# Patient Record
Sex: MALE | Employment: STUDENT | URBAN - METROPOLITAN AREA
[De-identification: names, ages, dates, MRNs, and addresses within clinical notes are randomized per-mention and may not be internally consistent; named-entity substitution may affect disease eponyms.]

---
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
## 2024-01-16 ENCOUNTER — HOSPITAL ENCOUNTER (OUTPATIENT)
Dept: PHYSICAL THERAPY | Age: 19
Setting detail: THERAPIES SERIES
Discharge: HOME | End: 2024-01-16
Payer: COMMERCIAL

## 2024-01-16 PROCEDURE — 97161 PT EVAL LOW COMPLEX 20 MIN: CPT

## 2024-01-16 NOTE — PLAN OF CARE
order to progress toward full function and prevent re-injury.    Status: [] Progressing: [] Met: [] Not Met: [] Adjusted  Patient will have a decrease in pain to 0/10 to help  facilitate improvement in movement, function, and ADLs as indicated by functional deficits.   Status: [] Progressing: [] Met: [] Not Met: [] Adjusted    Long Term Goals: To be achieved in: 8-10 weeks  Disability index score of 0% or less for the LEFS to assist with return top prior level of function.    Status: [] Progressing: [] Met: [] Not Met: [] Adjusted  Improve  knee AROM  Flexion to 135 degrees or  better to allow for proper joint functioning as indicated by patients functional deficits.  Status: [] Progressing: [] Met: [] Not Met: [] Adjusted  Pt to improve strength to show motor control/activation of quadriceps to facilitate functional mobility and ADLs.   Status: [] Progressing: [] Met: [] Not Met: [] Adjusted  Patient will return to Usual work, housework or activities, Usual recreational activities, squatting, and running without increased symptoms or restriction to work towards return to prior level of function.  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            Status: [] Progressing: [] Met: [] Not Met: [] Adjusted    TREATMENT PLAN     Frequency/Duration: 2x/week for 8-10 weeks for the following treatment interventions:    Interventions:  [x] Therapeutic exercise including: strength training, ROM, including postural re-education.   [x] NMR activation and proprioception, including postural re-education.    [x] Manual therapy as indicated to include: PROM, Gr I-IV mobilizations, and STM  [x] Modalities as needed that may include: Cryotherapy and Electrical Stimulation  [x] Patient education on joint protection, postural re-education, activity modification, progression of HEP.        [] Aquatic Therapy     HEP

## 2024-01-16 NOTE — FLOWSHEET NOTE
[facility] - Outpatient Rehabilitation and Therapy [address] [place] [address] office: [phone] fax: [phone]      Physical Therapy: TREATMENT/PROGRESS NOTE   Patient: Benedicto Hendricks (19 y.o. male)   Treatment Date: 2024   :  2005 MRN: [id number]   Visit #: 1   Insurance Allowable Auth Needed   Aetna []Yes    [x]No    Insurance: Payor: AETNA / Plan: AETNA / Product Type: *No Product type* /   Insurance ID: [id number] - (Commercial)  Secondary Insurance (if applicable): RICHAR VELOZ   Treatment Diagnosis:  Status post lateral meniscus repair of left knee [Z98.890], L knee pain M25.562   No diagnosis found.   Medical Diagnosis:    Status post lateral meniscus repair of left knee [Z98.890]   Referring Physician: Teresa Ocampo MD  PCP: No primary care provider on file.                             Plan of care signed (Y/N):     Date of Patient follow up with Physician:      Progress Report/POC: EVAL today  POC update due: (10 visits /OR AUTH LIMITS, whichever is less)  2/15/2024     Meniscal repair (Cut and paste Precautions/Contraindications from Eval)    Preferred Language for Healthcare:   [x]English       []other:    SUBJECTIVE EXAMINATION     Patient Report/Comments: see eval     Test used Initial score  2024   Pain Summary VAS 0    Functional questionnaire LEFS 40/80    Other:                OBJECTIVE EXAMINATION     Observation:     Test measurements: see eval    Exercises/Interventions:     Therapeutic Ex (51456)  resistance Sets/time Reps Notes/Cues/Progressions   Knee flexion strap stretch  5 4 for 20 sec                                                                   Manual Intervention (30030)  TIME                                        NMR re-education (35339) resistance Sets/time Reps CUES NEEDED                                      Therapeutic Activity (97257)  Sets/time     Stair navigation up/down  5 8 8 inch step, decreased left quad

## 2024-01-18 ENCOUNTER — HOSPITAL ENCOUNTER (OUTPATIENT)
Dept: PHYSICAL THERAPY | Age: 19
Setting detail: THERAPIES SERIES
Discharge: HOME | End: 2024-01-18
Payer: COMMERCIAL

## 2024-01-18 PROCEDURE — 97110 THERAPEUTIC EXERCISES: CPT

## 2024-01-18 PROCEDURE — 97112 NEUROMUSCULAR REEDUCATION: CPT

## 2024-01-18 PROCEDURE — 97140 MANUAL THERAPY 1/> REGIONS: CPT

## 2024-01-18 NOTE — FLOWSHEET NOTE
[facility] - Outpatient Rehabilitation and Therapy [address] [place] [address] office: [phone] fax: [phone]      Physical Therapy: TREATMENT/PROGRESS NOTE   Patient: Benedicto Hendricks (19 y.o. male)   Treatment Date: 2024   :  2005 MRN: [id number]   Visit #: 2   Insurance Allowable Auth Needed   Aetna []Yes    [x]No    Insurance: Payor: AETNA / Plan: AETNA / Product Type: *No Product type* /   Insurance ID: [id number] - (Commercial)  Secondary Insurance (if applicable): A XU VELOZ   Treatment Diagnosis:  Status post lateral meniscus repair of left knee [Z98.890], L knee pain M25.562   No diagnosis found.   Medical Diagnosis:    Status post lateral meniscus repair of left knee [Z98.890]   Referring Physician: Teresa Ocampo MD  PCP: No primary care provider on file.                             Plan of care signed (Y/N):     Date of Patient follow up with Physician:      Progress Report/POC: NO  POC update due: (10 visits /OR AUTH LIMITS, whichever is less)  2024     Meniscal repair (Cut and paste Precautions/Contraindications from Eval)    Preferred Language for Healthcare:   [x]English       []other:    SUBJECTIVE EXAMINATION     Patient Report/Comments: Patient reports no significant changes since last visit and it only feels uncomfortable after prolonged activity. Patient reports compliance with HEP with no issues reported. Patient reports swelling has remained the same and pain still present along the medial side of the knee consistent with last visit.     Test used Initial score  2024   Pain Summary VAS 0 1   Functional questionnaire LEFS 40/80    Other:                OBJECTIVE EXAMINATION     Observation: swelling noted along medial knee, no warmness or redness palpated. Hypomobility noted with patellar mobilizations    Test measurements: Left knee flexion AROM: 130 deg    Exercises/Interventions:     Therapeutic Ex (45925)  resistance

## 2024-01-23 ENCOUNTER — HOSPITAL ENCOUNTER (OUTPATIENT)
Dept: PHYSICAL THERAPY | Age: 19
Setting detail: THERAPIES SERIES
Discharge: HOME | End: 2024-01-23
Payer: COMMERCIAL

## 2024-01-23 PROCEDURE — 97110 THERAPEUTIC EXERCISES: CPT

## 2024-01-23 NOTE — FLOWSHEET NOTE
[facility] - Outpatient Rehabilitation and Therapy [address] [place] [address] office: [phone] fax: [phone]      Physical Therapy: TREATMENT/PROGRESS NOTE   Patient: Benedicto Hendricks (19 y.o. male)   Treatment Date: 2024   :  2005 MRN: [id number]   Visit #: 3   Insurance Allowable Auth Needed   Aetna []Yes    [x]No    Insurance: Payor: AETNA / Plan: AETNA / Product Type: *No Product type* /   Insurance ID: [id number] - (Commercial)  Secondary Insurance (if applicable): RICHAR VELOZ   Treatment Diagnosis:  Status post meniscus repair of left knee [Z98.890], L knee pain M25.562   No diagnosis found.   Medical Diagnosis:    Status post meniscus repair of left knee [Z98.890]   Referring Physician: Teresa Ocampo MD  PCP: No primary care provider on file.                             Plan of care signed (Y/N):     Date of Patient follow up with Physician:      Progress Report/POC: NO  POC update due: (10 visits /OR AUTH LIMITS, whichever is less)  2024     Meniscal repair (Cut and paste Precautions/Contraindications from Eval)    Preferred Language for Healthcare:   [x]English       []other:    SUBJECTIVE EXAMINATION     Patient Report/Comments: patient reports being out of the brace starting yesterday due to being at 7 weeks removed from surgery according to physician. Patient reports increased achiness yesterday but has returned to baseline. Pain is rated at 1/10 which was same compared to last visit.       Test used Initial score  2024   Pain Summary VAS 0 1   Functional questionnaire LEFS 40/80    Other:                OBJECTIVE EXAMINATION     Observation: Test measurements: Left knee flexion AROM: 130 deg, swelling present around L knee compared to R knee     Exercises/Interventions:     Therapeutic Ex (12233)  resistance Sets/time Reps Notes/Cues/Progressions   Bike  5 min     Knee flexion strap stretch   4 for 30 sec    Adduction stretch with

## 2024-01-25 ENCOUNTER — HOSPITAL ENCOUNTER (OUTPATIENT)
Dept: PHYSICAL THERAPY | Age: 19
Setting detail: THERAPIES SERIES
Discharge: HOME | End: 2024-01-25
Payer: COMMERCIAL

## 2024-01-25 PROCEDURE — 97112 NEUROMUSCULAR REEDUCATION: CPT

## 2024-01-25 PROCEDURE — 97140 MANUAL THERAPY 1/> REGIONS: CPT

## 2024-01-25 PROCEDURE — 97110 THERAPEUTIC EXERCISES: CPT

## 2024-01-25 NOTE — FLOWSHEET NOTE
[facility] - Outpatient Rehabilitation and Therapy [address] [place] [address] office: [phone] fax: [phone]      Physical Therapy: TREATMENT/PROGRESS NOTE   Patient: Benedicto Hendricks (19 y.o. male)   Treatment Date: 2024   :  2005 MRN: [id number]   Visit #: 4   Insurance Allowable Auth Needed   Aetna []Yes    [x]No    Insurance: Payor: AETNA / Plan: AETNA / Product Type: *No Product type* /   Insurance ID: [id number] - (Commercial)  Secondary Insurance (if applicable): RICHAR VELOZ   Treatment Diagnosis:  Status post meniscus repair of left knee [Z98.890], L knee pain M25.562   No diagnosis found.   Medical Diagnosis:    Status post meniscus repair of left knee [Z98.890]   Referring Physician: Teresa Ocampo MD  PCP: No primary care provider on file.                             Plan of care signed (Y/N):     Date of Patient follow up with Physician:      Progress Report/POC: NO  POC update due: (10 visits /OR AUTH LIMITS, whichever is less)  2024     Meniscal repair (Cut and paste Precautions/Contraindications from Eval)    Preferred Language for Healthcare:   [x]English       []other:    SUBJECTIVE EXAMINATION     Patient Report/Comments: Patient reports knee has been feeling better and patient reports decreased swelling. Patient reports no activities have been aggravating.        Test used Initial score  2024   Pain Summary VAS 0 0   Functional questionnaire LEFS 40/80    Other:                OBJECTIVE EXAMINATION     Observation: Test measurements: Reduced swelling noted in L knee compared to last visit    Exercises/Interventions:     Therapeutic Ex (37828)  resistance Sets/time Reps Notes/Cues/Progressions   Bike  5 min     Knee flexion strap stretch       Adduction stretch with strap       Long arc quads       sidestepping  3 2 Red band above knee. Verbal cues given to lead with heel to decrease ER bias   Step up with sport cord around

## 2024-01-30 ENCOUNTER — HOSPITAL ENCOUNTER (OUTPATIENT)
Dept: PHYSICAL THERAPY | Age: 19
Setting detail: THERAPIES SERIES
Discharge: HOME | End: 2024-01-30
Payer: COMMERCIAL

## 2024-01-30 PROCEDURE — 97110 THERAPEUTIC EXERCISES: CPT

## 2024-01-30 PROCEDURE — 97140 MANUAL THERAPY 1/> REGIONS: CPT

## 2024-01-30 NOTE — FLOWSHEET NOTE
the below criteria necessary for medical necessity for care and/or justification of therapy services:  The patient has functional impairments and/or activity limitations and would benefit from continued outpatient therapy services to address the deficits outlined in the patients goals    Treatment/Activity Tolerance:  [x] Patient tolerated treatment well [] Patient limited by fatique  [] Patient limited by pain  [] Patient limited by other medical complications  [] Other:     Return to Play: Stage 1: Intro to Strength    Prognosis for POC: [x] Good [] Fair  [] Poor    Patient requires continued skilled intervention: [x] Yes  [] No      GOALS     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   Status: [] Progressing: [] Met: [] Not Met: [] Adjusted     Therapist goals for Patient:   Short Term Goals: To be achieved in: 2 weeks  Independent in HEP and progression per patient tolerance, in order to progress toward full function and prevent re-injury.               Status: [] Progressing: [] Met: [] Not Met: [] Adjusted  Patient will have a decrease in pain to 0/10 to help  facilitate improvement in movement, function, and ADLs as indicated by functional deficits.              Status: [] Progressing: [] Met: [] Not Met: [] Adjusted     Long Term Goals: To be achieved in: 8-10 weeks  Disability index score of 0% or less for the LEFS to assist with return top prior level of function.                  Status: [] Progressing: [] Met: [] Not Met: [] Adjusted  Improve  knee AROM  Flexion to 135 degrees or  better to allow for proper joint functioning as indicated by patients functional deficits.  Status: [] Progressing: [] Met: [] Not Met: [] Adjusted  Pt to improve strength to show motor control/activation of quadriceps to facilitate functional mobility and ADLs.   Status: [] Progressing: [] Met: [] Not Met: [] Adjusted  Patient will return to Usual work, housework or activities, Usual recreational activities,

## 2024-02-01 ENCOUNTER — HOSPITAL ENCOUNTER (OUTPATIENT)
Dept: PHYSICAL THERAPY | Age: 19
Setting detail: THERAPIES SERIES
Discharge: HOME | End: 2024-02-01
Payer: COMMERCIAL

## 2024-02-01 PROCEDURE — 97110 THERAPEUTIC EXERCISES: CPT

## 2024-02-01 PROCEDURE — 97112 NEUROMUSCULAR REEDUCATION: CPT

## 2024-02-01 PROCEDURE — 97140 MANUAL THERAPY 1/> REGIONS: CPT

## 2024-02-01 NOTE — FLOWSHEET NOTE
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                                                                                                                      Status: [] Progressing: [] Met: [] Not Met: [] Adjusted    Overall Progression Towards Functional goals/ Treatment Progress Update:  [] Patient is progressing as expected towards functional goals listed.    [] Progression is slowed due to complexities/Impairments listed.  [] Progression has been slowed due to co-morbidities.  [x] Plan just implemented, too soon (<30days) to assess goals progression   [] Goals require adjustment due to lack of progress  [] Patient is not progressing as expected and requires additional follow up with physician  [] Other:     CHARGE CAPTURE     PT CHARGE GRID   CPT Code (TIMED) minutes # CPT Code (UNTIMED) #     Therex (44283)  25 2  EVAL:LOW (44505 - Typically 20 minutes face-to-face)     Neuromusc. Re-ed (91406) 15 1  Re-Eval (49752)     Manual (18217) 20 1  Estim Unattended (59635)     Ther. Act (66591)    Mech. Traction (45439)     Gait (92602)    Dry Needle 1-2 muscle (92238)     Aquatic Therex (99614)    Dry Needle 3+ muscle (20561)     Iontophoresis (85514)    VASO (74001)     Ultrasound (88317)    Group Therapy (72127)     Estim Attended (67567)    Canalith Repositioning (28828)     Other:    Other:    Total Timed Code Tx Minutes 60 4       Total Treatment Minutes 60      Charge Justification:  (85143) THERAPEUTIC EXERCISE - Provided verbal/tactile cueing for activities related to strengthening, flexibility, endurance, ROM performed to prevent loss of range of motion, maintain or improve muscular strength or increase flexibility, following either an injury or surgery.   (16617) HOME EXERCISE PROGRAM - Reviewed/Progressed HEP activities related to strengthening, flexibility, endurance, ROM performed to prevent loss of range of motion, maintain or improve

## 2024-02-06 ENCOUNTER — HOSPITAL ENCOUNTER (OUTPATIENT)
Dept: PHYSICAL THERAPY | Age: 19
Setting detail: THERAPIES SERIES
Discharge: HOME | End: 2024-02-06
Payer: COMMERCIAL

## 2024-02-06 PROCEDURE — 97140 MANUAL THERAPY 1/> REGIONS: CPT

## 2024-02-06 PROCEDURE — 97112 NEUROMUSCULAR REEDUCATION: CPT

## 2024-02-06 PROCEDURE — 97110 THERAPEUTIC EXERCISES: CPT

## 2024-02-06 NOTE — FLOWSHEET NOTE
[facility] - Outpatient Rehabilitation and Therapy [address] [place] [address] office: [phone] fax: [phone]      Physical Therapy: TREATMENT/PROGRESS NOTE   Patient: Benedicto Hendricks (19 y.o. male)   Treatment Date: 2024   :  2005 MRN: [id number]   Visit #: 7   Insurance Allowable Auth Needed   Aetna []Yes    [x]No    Insurance: Payor: AETNA / Plan: AETNA / Product Type: *No Product type* /   Insurance ID: [id number] - (Commercial)  Secondary Insurance (if applicable): A XU VELOZ   Treatment Diagnosis:  Status post meniscus repair of left knee [Z98.890], L knee pain M25.562   No diagnosis found.   Medical Diagnosis:    Status post meniscus repair of left knee [Z98.890]   Referring Physician: Teresa Ocampo MD  PCP: No primary care provider on file.                             Plan of care signed (Y/N):     Date of Patient follow up with Physician:      Progress Report/POC: NO  POC update due: (10 visits /OR AUTH LIMITS, whichever is less)  3/7/2024     Meniscal repair (Cut and paste Precautions/Contraindications from Eval)    Preferred Language for Healthcare:   [x]English       []other:    SUBJECTIVE EXAMINATION     Patient Report/Comments: Patient reports knee has been a little sore since the weekend, has been having to stand a lot more at practice. Doesn't feel like there has been anything significant to cause increased irritation.          Test used Initial score  2024   Pain Summary VAS 0 0   Functional questionnaire LEFS 40/80    Other:                OBJECTIVE EXAMINATION     Observation: Test measurements: Reduced swelling noted in L knee compared to last visit    Exercises/Interventions:     Therapeutic Ex (29224) 28 resistance Sets/time Reps Notes/Cues/Progressions   Bike  5 min     Adductor stretch  30 3    Hip flexor stretch- benedicto stretch  30 3    sidestepping Red band 3 2    Step up with yellow sport cord around waist with march up of

## 2024-02-08 ENCOUNTER — HOSPITAL ENCOUNTER (OUTPATIENT)
Dept: PHYSICAL THERAPY | Age: 19
Setting detail: THERAPIES SERIES
Discharge: HOME | End: 2024-02-08
Payer: COMMERCIAL

## 2024-02-08 PROCEDURE — 97112 NEUROMUSCULAR REEDUCATION: CPT

## 2024-02-08 PROCEDURE — 97140 MANUAL THERAPY 1/> REGIONS: CPT

## 2024-02-08 PROCEDURE — 97110 THERAPEUTIC EXERCISES: CPT

## 2024-02-08 NOTE — FLOWSHEET NOTE
[facility] - Outpatient Rehabilitation and Therapy [address] [place] [address] office: [phone] fax: [phone]      Physical Therapy: TREATMENT/PROGRESS NOTE   Patient: Benedicto Hendricks (19 y.o. male)   Treatment Date: 2024   :  2005 MRN: [id number]   Visit #: 8   Insurance Allowable Auth Needed   Aetna []Yes    [x]No    Insurance: Payor: AETNA / Plan: AETNA / Product Type: *No Product type* /   Insurance ID: [id number] - (Commercial)  Secondary Insurance (if applicable): RICHAR VELOZ   Treatment Diagnosis:  Status post meniscus repair of left knee [Z98.890], L knee pain M25.562   No diagnosis found.   Medical Diagnosis:    Status post meniscus repair of left knee [Z98.890]   Referring Physician: Teresa Ocampo MD  PCP: No primary care provider on file.                             Plan of care signed (Y/N):     Date of Patient follow up with Physician:      Progress Report/POC: NO  POC update due: (10 visits /OR AUTH LIMITS, whichever is less)  3/8/2024     Meniscal repair (Cut and paste Precautions/Contraindications from Eval)    Preferred Language for Healthcare:   [x]English       []other:    SUBJECTIVE EXAMINATION     Patient Report/Comments: Patient reports knee is feeling okay today. Was slightly sore after last session. He has some soreness in his quad as he stood a lot and rode the bike for 10 minutes yesterday.         Test used Initial score  2024   Pain Summary VAS 0 0   Functional questionnaire LEFS 40/80    Other:                OBJECTIVE EXAMINATION     Observation: Test measurements: Reduced swelling noted in L knee compared to last visit   R knee active flexion: 137 deg     Exercises/Interventions:     Therapeutic Ex (85397) 35 resistance Sets/time Reps Notes/Cues/Progressions   Bike  5 min           sidestepping Red band 3 2    Step up with yellow sport cord around waist with march up of opposite leg 12 in 1 15    Step up lateral 6 in 1

## 2024-02-12 ENCOUNTER — HOSPITAL ENCOUNTER (OUTPATIENT)
Dept: PHYSICAL THERAPY | Age: 19
Setting detail: THERAPIES SERIES
Discharge: HOME | End: 2024-02-12
Payer: COMMERCIAL

## 2024-02-12 PROCEDURE — 97110 THERAPEUTIC EXERCISES: CPT

## 2024-02-12 PROCEDURE — 97112 NEUROMUSCULAR REEDUCATION: CPT

## 2024-02-12 PROCEDURE — 97140 MANUAL THERAPY 1/> REGIONS: CPT

## 2024-02-12 NOTE — FLOWSHEET NOTE
[facility] - Outpatient Rehabilitation and Therapy [address] [place] [address] office: [phone] fax: [phone]      Physical Therapy: TREATMENT/PROGRESS NOTE   Patient: Benedicto Hendricks (19 y.o. male)   Treatment Date: 2024   :  2005 MRN: [id number]   Visit #: 9   Insurance Allowable Auth Needed   Aetna []Yes    [x]No    Insurance: Payor: AETNA / Plan: AETNA / Product Type: *No Product type* /   Insurance ID: [id number] - (Commercial)  Secondary Insurance (if applicable): RICHAR VELOZ   Treatment Diagnosis:  Status post meniscus repair of left knee [Z98.890], L knee pain M25.562   No diagnosis found.   Medical Diagnosis:    Status post meniscus repair of left knee [Z98.890]   Referring Physician: Teresa Ocampo MD  PCP: No primary care provider on file.                             Plan of care signed (Y/N):     Date of Patient follow up with Physician:      Progress Report/POC: NO  POC update due: (10 visits /OR AUTH LIMITS, whichever is less)  3/13/2024     Meniscal repair (Cut and paste Precautions/Contraindications from Eval)    Preferred Language for Healthcare:   [x]English       []other:    SUBJECTIVE EXAMINATION     Patient Report/Comments: Patient reports knee is feeling okay today. Not too sore after last session.          Test used Initial score  2024   Pain Summary VAS 0 0   Functional questionnaire LEFS 40/80    Other:                OBJECTIVE EXAMINATION     Observation: Test measurements: Reduced swelling noted in L knee compared to last visit   R knee active flexion: 137 deg     Exercises/Interventions:     Therapeutic Ex (79542) 35 resistance Sets/time Reps Notes/Cues/Progressions   Bike  5 min           Prone quad and hip flexor stretch with strap  30 4    sidestepping Red band 3 2    Step up with yellow sport cord around waist with march up of opposite leg 12 in 1 15    Step up lateral 8 in 1 15    Step down with heel tap 6 in 1 15

## 2024-02-13 ENCOUNTER — HOSPITAL ENCOUNTER (OUTPATIENT)
Dept: PHYSICAL THERAPY | Age: 19
Setting detail: THERAPIES SERIES
Discharge: HOME | End: 2024-02-13
Payer: COMMERCIAL

## 2024-02-13 PROCEDURE — 97110 THERAPEUTIC EXERCISES: CPT

## 2024-02-13 PROCEDURE — 97140 MANUAL THERAPY 1/> REGIONS: CPT

## 2024-02-13 PROCEDURE — 97530 THERAPEUTIC ACTIVITIES: CPT

## 2024-02-13 NOTE — PLAN OF CARE
[facility] - Outpatient Rehabilitation and Therapy [address] [place] [address] office: [phone] fax: [phone]        Physical Therapy Re-Certification Plan of Care/MD UPDATE      Dear Dr. Ocampo ,    We had the pleasure of treating the following patient for physical therapy services at [facility].  A summary of our findings can be found in the updated assessment below.  This includes our plan of care.  If you have any questions or concerns regarding these findings, please do not hesitate to contact me at the office phone number checked above.  Thank you for the referral.     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  During therapy this date, patient required verbal cueing, progression of exercises and program, and manual interventions for exercise progression and reduce/eliminate soft tissue swelling/inflammation/restriction. Patient will continue to benefit from ongoing evaluation and advanced clinical decision from a Physical Therapist to improve muscle strength, endurance, and functional mobility to safely return to PLOF and advanced sport activity without symptoms or restrictions.    Overall Response to Treatment:   [x]Patient is responding well to treatment and improvement is noted with regards to goals   []Patient should continue to improve in reasonable time if they continue HEP   []Patient has plateaued and is no longer responding to skilled PT intervention    []Patient is getting worse and would benefit from return to referring MD   []Patient unable to adhere to initial

## 2024-02-19 ENCOUNTER — HOSPITAL ENCOUNTER (OUTPATIENT)
Dept: PHYSICAL THERAPY | Age: 19
Setting detail: THERAPIES SERIES
Discharge: HOME | End: 2024-02-19
Payer: COMMERCIAL

## 2024-02-19 PROCEDURE — 97112 NEUROMUSCULAR REEDUCATION: CPT

## 2024-02-19 PROCEDURE — 97140 MANUAL THERAPY 1/> REGIONS: CPT

## 2024-02-19 PROCEDURE — 97110 THERAPEUTIC EXERCISES: CPT

## 2024-02-19 NOTE — FLOWSHEET NOTE
[facility] - Outpatient Rehabilitation and Therapy [address] [place] [address] office: [phone] fax: [phone]          Physical Therapy: TREATMENT/PROGRESS NOTE   Patient: Benedicto Hendricks (19 y.o. male)   Treatment Date: 2024   :  2005 MRN: [id number]   Visit #: 11   Insurance Allowable Auth Needed   Aetna []Yes    [x]No    Insurance: Payor: AETNA / Plan: AETNA / Product Type: *No Product type* /   Insurance ID: [id number] - (Commercial)  Secondary Insurance (if applicable): RICHAR VELOZ   Treatment Diagnosis:  Status post meniscus repair of left knee [Z98.890], L knee pain M25.562      Medical Diagnosis:    Status post meniscus repair of left knee [Z98.890]   Referring Physician: Teresa Ocampo MD  PCP: No primary care provider on file.                             Plan of care signed (Y/N):     Date of Patient follow up with Physician:      Progress Report/POC: YES, Date Range for this report: 24-24  POC update due: (10 visits /OR AUTH LIMITS, whichever is less)  3/20/2024     Meniscal repair (Cut and paste Precautions/Contraindications from Eval)    Preferred Language for Healthcare:   [x]English       []other:    SUBJECTIVE EXAMINATION     Patient Report/Comments: Patient reports knee is feeling stiff and sore. States that sitting on the bus for prolonged period while driving to [place] has made it more sore and tender. Feels tight in the hamstring.          Test used Initial score  2024   Pain Summary VAS 0 0   Functional questionnaire LEFS 40/80 54/80   Other:                OBJECTIVE EXAMINATION     Observation: limited swelling in L knee    Test measurements:     ROM/Strength: (Blank cells denote NT)     Mvmt (norm) AROM L AROM R Notes                                 HIP Flex (120) 135 136      Abd (45)          ER (50) 30 wnl      IR (45) 25 25      Ext (20)                     KNEE Flex (140) 132 (with pain 5/10) 135      Ext (0) 0 0

## 2024-02-21 ENCOUNTER — HOSPITAL ENCOUNTER (OUTPATIENT)
Dept: PHYSICAL THERAPY | Age: 19
Setting detail: THERAPIES SERIES
Discharge: HOME | End: 2024-02-21
Payer: COMMERCIAL

## 2024-02-21 PROCEDURE — 97112 NEUROMUSCULAR REEDUCATION: CPT

## 2024-02-21 PROCEDURE — 97140 MANUAL THERAPY 1/> REGIONS: CPT

## 2024-02-21 PROCEDURE — 97110 THERAPEUTIC EXERCISES: CPT

## 2024-02-21 NOTE — FLOWSHEET NOTE
[facility] - Outpatient Rehabilitation and Therapy [address] [place] [address] office: [phone] fax: [phone]          Physical Therapy: TREATMENT/PROGRESS NOTE   Patient: Benedicto Hendricks (19 y.o. male)   Treatment Date: 2024   :  2005 MRN: [id number]   Visit #: 12   Insurance Allowable Auth Needed   Aetna []Yes    [x]No    Insurance: Payor: AETNA / Plan: AETNA / Product Type: *No Product type* /   Insurance ID: [id number] - (Commercial)  Secondary Insurance (if applicable): RICHAR VELOZ   Treatment Diagnosis:  Status post meniscus repair of left knee [Z98.890], L knee pain M25.562      Medical Diagnosis:    Status post meniscus repair of left knee [Z98.890]   Referring Physician: Teresa Ocampo MD  PCP: No primary care provider on file.                             Plan of care signed (Y/N):     Date of Patient follow up with Physician:      Progress Report/POC: YES, Date Range for this report: 24-24  POC update due: (10 visits /OR AUTH LIMITS, whichever is less)  3/22/2024     Meniscal repair (Cut and paste Precautions/Contraindications from Eval)    Preferred Language for Healthcare:   [x]English       []other:    SUBJECTIVE EXAMINATION     Patient Report/Comments: Chu reports that his knee is doing well. Chief complaint is soreness at lateral quad and hamstring. Feels that knee is continuing to improve.        Test used Initial score  2024   Pain Summary VAS 0 0   Functional questionnaire LEFS 40/80 54/80   Other:                OBJECTIVE EXAMINATION     Observation: limited swelling in L knee    Test measurements:     ROM/Strength: (Blank cells denote NT)     Mvmt (norm) AROM L AROM R Notes                                 HIP Flex (120) 135 136      Abd (45)          ER (50) 30 wnl      IR (45) 25 25      Ext (20)                     KNEE Flex (140) 132 (with pain 5/10) 135      Ext (0) 0 0                         (Seated assessment 
muscle (53593)     Iontophoresis (03202)    VASO (32812)     Ultrasound (59532)    Group Therapy (54413)     Estim Attended (85732)    Canalith Repositioning (50611)     Other:    Other:    Total Timed Code Tx Minutes 54 4       Total Treatment Minutes 54      Charge Justification:  (77058) THERAPEUTIC EXERCISE - Provided verbal/tactile cueing for activities related to strengthening, flexibility, endurance, ROM performed to prevent loss of range of motion, maintain or improve muscular strength or increase flexibility, following either an injury or surgery.   (20775) HOME EXERCISE PROGRAM - Reviewed/Progressed HEP activities related to strengthening, flexibility, endurance, ROM performed to prevent loss of range of motion, maintain or improve muscular strength or increase flexibility, following either an injury or surgery.  (32289) NEUROMUSCULAR RE-EDUCATION - Therapeutic procedure, 1 or more areas, each 15 minutes; neuromuscular reeducation of movement, balance, coordination, kinesthetic sense, posture, and/or proprioception for sitting and/or standing activities  (96360) MANUAL THERAPY -  Manual therapy techniques, 1 or more regions, each 15 minutes (Mobilization/manipulation, manual lymphatic drainage, manual traction) for the purpose of modulating pain, promoting relaxation,  increasing ROM, reducing/eliminating soft tissue swelling/inflammation/restriction, improving soft tissue extensibility and allowing for proper ROM for normal function with self care, mobility, lifting and ambulation    TREATMENT PLAN   Plan:  Continue plan of care with focus on improving knee flexion ROM and hip mobility. Continue to improve strength, specifically quad activation and control Continue PT 1-2x/week for 8 weeks.     Electronically Signed by Debbie Gasca PTA              Date: 02/21/2024     Note: If patient does not return for scheduled/recommended follow up visits, this note will serve as a discharge from care along

## 2024-02-26 ENCOUNTER — HOSPITAL ENCOUNTER (OUTPATIENT)
Dept: PHYSICAL THERAPY | Age: 19
Setting detail: THERAPIES SERIES
End: 2024-02-26
Payer: COMMERCIAL

## 2024-02-28 ENCOUNTER — HOSPITAL ENCOUNTER (OUTPATIENT)
Dept: PHYSICAL THERAPY | Age: 19
Setting detail: THERAPIES SERIES
Discharge: HOME | End: 2024-02-28
Payer: COMMERCIAL

## 2024-02-28 PROCEDURE — 97140 MANUAL THERAPY 1/> REGIONS: CPT

## 2024-02-28 PROCEDURE — 97110 THERAPEUTIC EXERCISES: CPT

## 2024-02-28 PROCEDURE — 97112 NEUROMUSCULAR REEDUCATION: CPT

## 2024-02-28 NOTE — FLOWSHEET NOTE
[facility] - Outpatient Rehabilitation and Therapy [address] [place] [address] office: [phone] fax: [phone]          Physical Therapy: TREATMENT/PROGRESS NOTE   Patient: Benedicto Hendricks (19 y.o. male)   Treatment Date: 2024   :  2005 MRN: [id number]   Visit #: 13   Insurance Allowable Auth Needed   Aetna []Yes    [x]No    Insurance: Payor: AETNA / Plan: AETNA / Product Type: *No Product type* /   Insurance ID: [id number] - (Commercial)  Secondary Insurance (if applicable): RICHAR VELOZ   Treatment Diagnosis:  Status post meniscus repair of left knee [Z98.890], L knee pain M25.562      Medical Diagnosis:    Status post meniscus repair of left knee [Z98.890]   Referring Physician: Teresa Ocampo MD  PCP: No primary care provider on file.                             Plan of care signed (Y/N):     Date of Patient follow up with Physician:      Progress Report/POC: NO  POC update due: (10 visits /OR AUTH LIMITS, whichever is less)  3/29/2024     Meniscal repair (Cut and paste Precautions/Contraindications from Eval)    Preferred Language for Healthcare:   [x]English       []other:    SUBJECTIVE EXAMINATION     Patient Report/Comments: Chu reports that his hamstring and calf are still feeling very tight. No complaints of pain entering PT today.        Test used Initial score  2024   Pain Summary VAS 0 0   Functional questionnaire LEFS 40/80 54/80   Other:                OBJECTIVE EXAMINATION     Observation: limited swelling in L knee    Test measurements:     ROM/Strength: (Blank cells denote NT)     Mvmt (norm) AROM L AROM R Notes                                 HIP Flex (120) 135 136      Abd (45)          ER (50) 30 wnl      IR (45) 25 25      Ext (20)                     KNEE Flex (140) 132 (with pain 5/10) 135      Ext (0) 0 0                         (Seated assessment today) MMT L MMT R Notes         HIP Flexion 29.1 36.5      Abduction 28.3 40.4

## 2024-03-04 ENCOUNTER — HOSPITAL ENCOUNTER (OUTPATIENT)
Dept: PHYSICAL THERAPY | Age: 19
Setting detail: THERAPIES SERIES
Discharge: HOME | End: 2024-03-04
Payer: COMMERCIAL

## 2024-03-04 PROCEDURE — 97140 MANUAL THERAPY 1/> REGIONS: CPT

## 2024-03-04 PROCEDURE — 97112 NEUROMUSCULAR REEDUCATION: CPT

## 2024-03-04 PROCEDURE — 97110 THERAPEUTIC EXERCISES: CPT

## 2024-03-04 NOTE — FLOWSHEET NOTE
[facility] - Outpatient Rehabilitation and Therapy [address] [place] [address] office: [phone] fax: [phone]          Physical Therapy: TREATMENT/PROGRESS NOTE   Patient: Benedicto Hendricks (19 y.o. male)   Treatment Date: 2024   :  2005 MRN: [id number]   Visit #: 14   Insurance Allowable Auth Needed   Aetna []Yes    [x]No    Insurance: Payor: AETNA / Plan: AETNA / Product Type: *No Product type* /   Insurance ID: [id number] - (Commercial)  Secondary Insurance (if applicable): RICHAR VELOZ   Treatment Diagnosis:  Status post meniscus repair of left knee [Z98.890], L knee pain M25.562      Medical Diagnosis:    Status post meniscus repair of left knee [Z98.890]   Referring Physician: Teresa Ocampo MD  PCP: No primary care provider on file.                             Plan of care signed (Y/N):     Date of Patient follow up with Physician:      Progress Report/POC: NO  POC update due: (10 visits /OR AUTH LIMITS, whichever is less)  4/3/2024     Meniscal repair (Cut and paste Precautions/Contraindications from Eval)    Preferred Language for Healthcare:   [x]English       []other:    SUBJECTIVE EXAMINATION     Patient Report/Comments: Chu reports that his knee is doing well. No complaints of pain entering PT today. Feels that strength and function are improving. States that he has started some light plyos with [employer].       Test used Initial score  2024   Pain Summary VAS 0 0   Functional questionnaire LEFS 40/80 54/80   Other:                OBJECTIVE EXAMINATION     Observation: limited swelling in L knee    Test measurements:     ROM/Strength: (Blank cells denote NT)     Mvmt (norm) AROM L AROM R Notes                                 HIP Flex (120) 135 136      Abd (45)          ER (50) 30 wnl      IR (45) 25 25      Ext (20)                     KNEE Flex (140) 132 (with pain 5/10) 135      Ext (0) 0 0                         (Seated assessment today)

## 2024-03-06 ENCOUNTER — APPOINTMENT (OUTPATIENT)
Dept: PHYSICAL THERAPY | Age: 19
End: 2024-03-06
Payer: COMMERCIAL

## 2024-03-07 ENCOUNTER — HOSPITAL ENCOUNTER (OUTPATIENT)
Dept: PHYSICAL THERAPY | Age: 19
Setting detail: THERAPIES SERIES
Discharge: HOME | End: 2024-03-07
Payer: COMMERCIAL

## 2024-03-07 PROCEDURE — 97140 MANUAL THERAPY 1/> REGIONS: CPT

## 2024-03-07 PROCEDURE — 97110 THERAPEUTIC EXERCISES: CPT

## 2024-03-07 PROCEDURE — 97112 NEUROMUSCULAR REEDUCATION: CPT

## 2024-03-07 NOTE — FLOWSHEET NOTE
[facility] - Outpatient Rehabilitation and Therapy [address] [place] [address] office: [phone] fax: [phone]          Physical Therapy: TREATMENT/PROGRESS NOTE   Patient: Benedicto Hendricks (19 y.o. male)   Treatment Date: 2024   :  2005 MRN: [id number]   Visit #: 15   Insurance Allowable Auth Needed   Aetna []Yes    [x]No    Insurance: Payor: AETNA / Plan: AETNA / Product Type: *No Product type* /   Insurance ID: [id number] - (Commercial)  Secondary Insurance (if applicable): RICHAR VELOZ   Treatment Diagnosis:  Status post meniscus repair of left knee [Z98.890], L knee pain M25.562      Medical Diagnosis:    Status post meniscus repair of left knee [Z98.890]   Referring Physician: Teresa Ocampo MD  PCP: No primary care provider on file.                             Plan of care signed (Y/N):     Date of Patient follow up with Physician:      Progress Report/POC: NO  POC update due: (10 visits /OR AUTH LIMITS, whichever is less)  2024     Meniscal repair (Cut and paste Precautions/Contraindications from Eval)    Preferred Language for Healthcare:   [x]English       []other:    SUBJECTIVE EXAMINATION     Patient Report/Comments: Chu reports that his knee is doing well; some tightness through hamstring and quad today.         Test used Initial score  2024   Pain Summary VAS 0 0   Functional questionnaire LEFS 40/80 54/80   Other:                OBJECTIVE EXAMINATION     Observation: limited swelling in L knee    Test measurements:     ROM/Strength: (Blank cells denote NT)     Mvmt (norm) AROM L AROM R Notes                                 HIP Flex (120) 135 136      Abd (45)          ER (50) 30 wnl      IR (45) 25 25      Ext (20)                     KNEE Flex (140) 132 (with pain 5/10) 135      Ext (0) 0 0                         (Seated assessment today) MMT L MMT R Notes         HIP Flexion 29.1 36.5      Abduction 28.3 40.4      Adduction nt nt

## 2024-03-12 ENCOUNTER — HOSPITAL ENCOUNTER (OUTPATIENT)
Dept: PHYSICAL THERAPY | Age: 19
Setting detail: THERAPIES SERIES
Discharge: HOME | End: 2024-03-12
Payer: COMMERCIAL

## 2024-03-12 PROCEDURE — 97112 NEUROMUSCULAR REEDUCATION: CPT

## 2024-03-12 PROCEDURE — 97110 THERAPEUTIC EXERCISES: CPT

## 2024-03-12 PROCEDURE — 97140 MANUAL THERAPY 1/> REGIONS: CPT

## 2024-03-12 NOTE — FLOWSHEET NOTE
[facility] - Outpatient Rehabilitation and Therapy [address] [place] [address] office: [phone] fax: [phone]          Physical Therapy: TREATMENT/PROGRESS NOTE   Patient: Benedicto Hendricks (19 y.o. male)   Treatment Date: 2024   :  2005 MRN: [id number]   Visit #: 16   Insurance Allowable Auth Needed   Aetna []Yes    [x]No    Insurance: Payor: AETNA / Plan: AETNA / Product Type: *No Product type* /   Insurance ID: [id number] - (Commercial)  Secondary Insurance (if applicable): RICHAR VELOZ   Treatment Diagnosis:  Status post meniscus repair of left knee [Z98.890], L knee pain M25.562      Medical Diagnosis:    Status post meniscus repair of left knee [Z98.890]   Referring Physician: Teresa Ocampo MD  PCP: No primary care provider on file.                             Plan of care signed (Y/N):     Date of Patient follow up with Physician:      Progress Report/POC: NO  POC update due: (10 visits /OR AUTH LIMITS, whichever is less)  2024     Meniscal repair (Cut and paste Precautions/Contraindications from Eval)    Preferred Language for Healthcare:   [x]English       []other:    SUBJECTIVE EXAMINATION     Patient Report/Comments: Chu reports that he has some lateral quad tightness. States that he worked out hard with [employer] yesterday.        Test used Initial score  2024   Pain Summary VAS 0 0   Functional questionnaire LEFS 40/80 54/80   Other:                OBJECTIVE EXAMINATION     Observation: limited swelling in L knee    Test measurements:     ROM/Strength: (Blank cells denote NT)     Mvmt (norm) AROM L AROM R Notes                                 HIP Flex (120) 135 136      Abd (45)          ER (50) 30 wnl      IR (45) 25 25      Ext (20)                     KNEE Flex (140) 132 (with pain 5/10) 135      Ext (0) 0 0                         (Seated assessment today) MMT L MMT R Notes         HIP Flexion 29.1 36.5      Abduction 28.3 40.4

## 2024-03-13 ENCOUNTER — HOSPITAL ENCOUNTER (OUTPATIENT)
Dept: PHYSICAL THERAPY | Age: 19
Setting detail: THERAPIES SERIES
Discharge: HOME | End: 2024-03-13
Payer: COMMERCIAL

## 2024-03-13 PROCEDURE — 97140 MANUAL THERAPY 1/> REGIONS: CPT

## 2024-03-13 PROCEDURE — 97112 NEUROMUSCULAR REEDUCATION: CPT

## 2024-03-13 PROCEDURE — 97110 THERAPEUTIC EXERCISES: CPT

## 2024-03-13 NOTE — FLOWSHEET NOTE
cueing for activities related to strengthening, flexibility, endurance, ROM performed to prevent loss of range of motion, maintain or improve muscular strength or increase flexibility, following either an injury or surgery.   (89430) HOME EXERCISE PROGRAM - Reviewed/Progressed HEP activities related to strengthening, flexibility, endurance, ROM performed to prevent loss of range of motion, maintain or improve muscular strength or increase flexibility, following either an injury or surgery.  (02617) NEUROMUSCULAR RE-EDUCATION - Therapeutic procedure, 1 or more areas, each 15 minutes; neuromuscular reeducation of movement, balance, coordination, kinesthetic sense, posture, and/or proprioception for sitting and/or standing activities  (42592) MANUAL THERAPY -  Manual therapy techniques, 1 or more regions, each 15 minutes (Mobilization/manipulation, manual lymphatic drainage, manual traction) for the purpose of modulating pain, promoting relaxation,  increasing ROM, reducing/eliminating soft tissue swelling/inflammation/restriction, improving soft tissue extensibility and allowing for proper ROM for normal function with self care, mobility, lifting and ambulation    TREATMENT PLAN   Plan:  Continue plan of care with focus on improving knee flexion ROM and hip mobility. Continue to improve strength, specifically quad activation and control Continue PT 1-2x/week for 8 weeks.     Electronically Signed by Debbie Gasca PTA              Date: 03/13/2024     Note: If patient does not return for scheduled/recommended follow up visits, this note will serve as a discharge from care along with the most recent update on progress.

## 2024-03-14 ENCOUNTER — APPOINTMENT (OUTPATIENT)
Dept: PHYSICAL THERAPY | Age: 19
End: 2024-03-14
Payer: COMMERCIAL

## 2024-03-18 ENCOUNTER — HOSPITAL ENCOUNTER (OUTPATIENT)
Dept: PHYSICAL THERAPY | Age: 19
Setting detail: THERAPIES SERIES
Discharge: HOME | End: 2024-03-18
Payer: COMMERCIAL

## 2024-03-18 PROCEDURE — 97140 MANUAL THERAPY 1/> REGIONS: CPT

## 2024-03-18 PROCEDURE — 97112 NEUROMUSCULAR REEDUCATION: CPT

## 2024-03-18 PROCEDURE — 97110 THERAPEUTIC EXERCISES: CPT

## 2024-03-18 NOTE — FLOWSHEET NOTE
[facility] - Outpatient Rehabilitation and Therapy [address] [place] [address] office: [phone] fax: [phone]          Physical Therapy: TREATMENT/PROGRESS NOTE   Patient: Benedicto Hendricks (19 y.o. male)   Treatment Date: 2024   :  2005 MRN: [id number]   Visit #: 18   Insurance Allowable Auth Needed   Aetna []Yes    [x]No    Insurance: Payor: AETNA / Plan: AETNA / Product Type: *No Product type* /   Insurance ID: [id number] - (Commercial)  Secondary Insurance (if applicable): RICHAR VELOZ   Treatment Diagnosis:  Status post meniscus repair of left knee [Z98.890], L knee pain M25.562      Medical Diagnosis:    Status post meniscus repair of left knee [Z98.890]   Referring Physician: Teresa Ocampo MD  PCP: No primary care provider on file.                             Plan of care signed (Y/N):     Date of Patient follow up with Physician:      Progress Report/POC: NO  POC update due: (10 visits /OR AUTH LIMITS, whichever is less)  2024     Meniscal repair (Cut and paste Precautions/Contraindications from Eval)    Preferred Language for Healthcare:   [x]English       []other:    SUBJECTIVE EXAMINATION     Patient Report/Comments: Chu reports that his knee is doing well. No complaints of pain entering PT today.        Test used Initial score  2024   Pain Summary VAS 0 0   Functional questionnaire LEFS 40/80 54/80   Other:                OBJECTIVE EXAMINATION     Observation: limited swelling in L knee    Test measurements:     ROM/Strength: (Blank cells denote NT)     Mvmt (norm) AROM L AROM R Notes                                 HIP Flex (120) 135 136      Abd (45)          ER (50) 30 wnl      IR (45) 25 25      Ext (20)                     KNEE Flex (140) 132 (with pain 5/10) 135      Ext (0) 0 0                         (Seated assessment today) MMT L MMT R Notes         HIP Flexion 29.1 36.5      Abduction 28.3 40.4      Adduction nt nt      ER

## 2024-03-21 ENCOUNTER — APPOINTMENT (OUTPATIENT)
Dept: PHYSICAL THERAPY | Age: 19
End: 2024-03-21
Payer: COMMERCIAL

## 2024-04-03 ENCOUNTER — HOSPITAL ENCOUNTER (OUTPATIENT)
Dept: PHYSICAL THERAPY | Age: 19
Setting detail: THERAPIES SERIES
Discharge: HOME | End: 2024-04-03
Payer: COMMERCIAL

## 2024-04-03 PROCEDURE — 97140 MANUAL THERAPY 1/> REGIONS: CPT

## 2024-04-03 PROCEDURE — 97112 NEUROMUSCULAR REEDUCATION: CPT

## 2024-04-03 PROCEDURE — 97110 THERAPEUTIC EXERCISES: CPT

## 2024-04-03 NOTE — FLOWSHEET NOTE
[facility] - Outpatient Rehabilitation and Therapy [address] [place] [address] office: [phone] fax: [phone]          Physical Therapy: TREATMENT/PROGRESS NOTE   Patient: Benedicto Hendricks (19 y.o. male)   Treatment Date: 2024   :  2005 MRN: [id number]   Visit #: 19   Insurance Allowable Auth Needed   Aetna []Yes    [x]No    Insurance: Payor: AETNA / Plan: AETNA / Product Type: *No Product type* /   Insurance ID: [id number] - (Commercial)  Secondary Insurance (if applicable): RICHAR VELOZ   Treatment Diagnosis:  Status post meniscus repair of left knee [Z98.890], L knee pain M25.562      Medical Diagnosis:    Status post meniscus repair of left knee [Z98.890]   Referring Physician: Teresa Ocampo MD  PCP: No primary care provider on file.                             Plan of care signed (Y/N):     Date of Patient follow up with Physician:      Progress Report/POC: NO  POC update due: (10 visits /OR AUTH LIMITS, whichever is less)  5/3/2024     Meniscal repair (Cut and paste Precautions/Contraindications from Eval)    Preferred Language for Healthcare:   [x]English       []other:    SUBJECTIVE EXAMINATION     Patient Report/Comments: Chu reports that his knee is doing well. No complaints of pain entering PT today.        Test used Initial score  2024   Pain Summary VAS 0 0   Functional questionnaire LEFS 40/80 54/80   Other:                OBJECTIVE EXAMINATION     Observation: limited swelling in L knee    Test measurements:     ROM/Strength: (Blank cells denote NT)     Mvmt (norm) AROM L AROM R Notes                                 HIP Flex (120) 135 136      Abd (45)          ER (50) 30 wnl      IR (45) 25 25      Ext (20)                     KNEE Flex (140) 132 (with pain 5/10) 135      Ext (0) 0 0                         (Seated assessment today) MMT L MMT R Notes         HIP Flexion 29.1 36.5      Abduction 28.3 40.4      Adduction nt nt      ER

## 2024-04-08 ENCOUNTER — HOSPITAL ENCOUNTER (OUTPATIENT)
Dept: PHYSICAL THERAPY | Age: 19
Setting detail: THERAPIES SERIES
Discharge: HOME | End: 2024-04-08
Payer: COMMERCIAL

## 2024-04-08 PROCEDURE — 97530 THERAPEUTIC ACTIVITIES: CPT

## 2024-04-08 PROCEDURE — 97112 NEUROMUSCULAR REEDUCATION: CPT

## 2024-04-08 PROCEDURE — 97110 THERAPEUTIC EXERCISES: CPT

## 2024-04-08 NOTE — PLAN OF CARE
[facility] - Outpatient Rehabilitation and Therapy [address] [place] [address] office: [phone] fax: [phone]        Physical Therapy Re-Certification Plan of Care/MD UPDATE      Dear  Dr. Ocampo,    We had the pleasure of treating the following patient for physical therapy services at [facility].  A summary of our findings can be found in the updated assessment below.  This includes our plan of care.  If you have any questions or concerns regarding these findings, please do not hesitate to contact me at the office phone number checked above.  Thank you for the referral.     Physician Signature:________________________________Date:__________________  By signing above (or electronic signature), therapist’s plan is approved by physician      Current Functional Status:   Benedicto Hendricks IV 2005 continues to present with functional deficits in endurance of strength, cardiovascular endurance, and eccentric control  limiting ability with  sport progression  .  During therapy this date, patient required visual cueing, verbal cueing, and progression of exercises and program for exercise progression and improving proper muscle recruitment and activation/motor control patterns. Patient will continue to benefit from ongoing evaluation and advanced clinical decision from a Physical Therapist to improve proper body mechanics in exercise and sport progression to safely return to PLOF and advanced sport activity without symptoms or restrictions.    Overall Response to Treatment:   []Patient is responding well to treatment and improvement is noted with regards to goals   []Patient should continue to improve in reasonable time if they continue HEP   []Patient has plateaued and is no longer responding to skilled PT intervention    []Patient is getting worse and would benefit from return to referring MD   []Patient unable to adhere to initial POC   []Other: Doing

## 2024-04-10 ENCOUNTER — HOSPITAL ENCOUNTER (OUTPATIENT)
Dept: PHYSICAL THERAPY | Age: 19
Setting detail: THERAPIES SERIES
Discharge: HOME | End: 2024-04-10
Payer: COMMERCIAL

## 2024-04-10 PROCEDURE — 97530 THERAPEUTIC ACTIVITIES: CPT

## 2024-04-10 PROCEDURE — 97110 THERAPEUTIC EXERCISES: CPT

## 2024-04-10 NOTE — FLOWSHEET NOTE
[facility] - Outpatient Rehabilitation and Therapy [address] [place] [address] office: [phone] fax: [phone]         Physical Therapy: TREATMENT/PROGRESS NOTE   Patient: Benedicto Hendricks IV (19 y.o. male)   Treatment Date: 04/10/2024   :  2005 MRN: [id number]   Visit #: 21   Insurance Allowable Auth Needed   Aetna []Yes    [x]No    Insurance: Payor:  [employer] / Plan: [employer] / Product Type: Indemnity /   Insurance ID: [id number] - (Commercial)  Secondary Insurance (if applicable):    Treatment Diagnosis:  Status post meniscus repair of left knee [Z98.890], L knee pain M25.562      Medical Diagnosis:    Status post meniscus repair of left knee [Z98.890] DOS 23   Referring Physician: Teresa Ocampo MD  PCP: No primary care provider on file.                             Plan of care signed (Y/N):     Date of Patient follow up with Physician:      Progress Report/POC: NO  POC update due: (10 visits /OR AUTH LIMITS, whichever is less)  2024     Meniscal repair (Cut and paste Precautions/Contraindications from Eval)    Preferred Language for Healthcare:   [x]English       []other:    SUBJECTIVE EXAMINATION     Patient Report/Comments: Reports doing well, no soreness today.       Test used Initial score  1/16/24 04/10/2024   Pain Summary VAS 0 0   Functional questionnaire LEFS 40/80 73/80   Other:                OBJECTIVE EXAMINATION     Observation: mild decrease in quad muscle girth    Test measurements:     4/10/2023  Isokinetic Test Results: 4/10/2024 (see media for scanned results)  Bilateral Difference:  Quadricep 60 deg/sec: 19% [x] Deficit   [] Surplus 180 deg/sec: 2% [] Deficit   [x] Surplus   Hamstring 60 deg/sec: 17% [x] Deficit   [] Surplus 180 deg/sec: 16% [x] Deficit   [] Surplus      Normative Data, 60 degrees/second:  Quadricep Normal: >85% Patient: 59%   Hamstring Normal: 55-60% Patient: 65%      Normative Data, 180